# Patient Record
Sex: MALE | Race: WHITE
[De-identification: names, ages, dates, MRNs, and addresses within clinical notes are randomized per-mention and may not be internally consistent; named-entity substitution may affect disease eponyms.]

---

## 2017-05-12 ENCOUNTER — HOSPITAL ENCOUNTER (EMERGENCY)
Dept: HOSPITAL 35 - ER | Age: 52
Discharge: HOME | End: 2017-05-12
Payer: COMMERCIAL

## 2017-05-12 VITALS — SYSTOLIC BLOOD PRESSURE: 114 MMHG | DIASTOLIC BLOOD PRESSURE: 68 MMHG

## 2017-05-12 VITALS — BODY MASS INDEX: 39.55 KG/M2 | HEIGHT: 69 IN | WEIGHT: 267 LBS

## 2017-05-12 DIAGNOSIS — I47.1: Primary | ICD-10-CM

## 2017-05-12 DIAGNOSIS — Z88.5: ICD-10-CM

## 2019-09-24 ENCOUNTER — HOSPITAL ENCOUNTER (EMERGENCY)
Dept: HOSPITAL 35 - ER | Age: 54
Discharge: HOME | End: 2019-09-24
Payer: COMMERCIAL

## 2019-09-24 VITALS — SYSTOLIC BLOOD PRESSURE: 100 MMHG | DIASTOLIC BLOOD PRESSURE: 58 MMHG

## 2019-09-24 VITALS — BODY MASS INDEX: 41.47 KG/M2 | WEIGHT: 280.01 LBS | HEIGHT: 69 IN

## 2019-09-24 DIAGNOSIS — Z88.5: ICD-10-CM

## 2019-09-24 DIAGNOSIS — I47.1: Primary | ICD-10-CM

## 2019-09-24 DIAGNOSIS — Z90.49: ICD-10-CM

## 2019-09-24 LAB
ALBUMIN SERPL-MCNC: 3.6 G/DL (ref 3.4–5)
ALT SERPL-CCNC: 42 U/L (ref 30–65)
ANION GAP SERPL CALC-SCNC: 11 MMOL/L (ref 7–16)
APTT BLD: 25.4 SECONDS (ref 24.5–32.8)
AST SERPL-CCNC: 39 U/L (ref 15–37)
BASOPHILS NFR BLD AUTO: 0.7 % (ref 0–2)
BILIRUB SERPL-MCNC: 0.8 MG/DL
BUN SERPL-MCNC: 21 MG/DL (ref 7–18)
CALCIUM SERPL-MCNC: 8.6 MG/DL (ref 8.5–10.1)
CHLORIDE SERPL-SCNC: 103 MMOL/L (ref 98–107)
CO2 SERPL-SCNC: 23 MMOL/L (ref 21–32)
CREAT SERPL-MCNC: 1 MG/DL (ref 0.7–1.3)
EOSINOPHIL NFR BLD: 3.5 % (ref 0–3)
ERYTHROCYTE [DISTWIDTH] IN BLOOD BY AUTOMATED COUNT: 13.3 % (ref 10.5–14.5)
GLUCOSE SERPL-MCNC: 99 MG/DL (ref 74–106)
GRANULOCYTES NFR BLD MANUAL: 50 % (ref 36–66)
HCT VFR BLD CALC: 47 % (ref 42–52)
HGB BLD-MCNC: 16 GM/DL (ref 14–18)
INR PPP: 1
LYMPHOCYTES NFR BLD AUTO: 34.5 % (ref 24–44)
MAGNESIUM SERPL-MCNC: 2.2 MG/DL (ref 1.8–2.4)
MCH RBC QN AUTO: 30.8 PG (ref 26–34)
MCHC RBC AUTO-ENTMCNC: 34.1 G/DL (ref 28–37)
MCV RBC: 90.3 FL (ref 80–100)
MONOCYTES NFR BLD: 11.3 % (ref 1–8)
NEUTROPHILS # BLD: 4.7 THOU/UL (ref 1.4–8.2)
PLATELET # BLD: 215 THOU/UL (ref 150–400)
POTASSIUM SERPL-SCNC: 4.9 MMOL/L (ref 3.5–5.1)
PROT SERPL-MCNC: 7.4 G/DL (ref 6.4–8.2)
PROTHROMBIN TIME: 10.3 SECONDS (ref 9.3–11.4)
RBC # BLD AUTO: 5.21 MIL/UL (ref 4.5–6)
SODIUM SERPL-SCNC: 137 MMOL/L (ref 136–145)
TROPONIN I SERPL-MCNC: <0.06 NG/ML (ref ?–0.06)
WBC # BLD AUTO: 9.5 THOU/UL (ref 4–11)

## 2019-09-25 NOTE — EKG
[facility] [place] [address]  [address]
Phone:  [phone]                    ELECTROCARDIOGRAM REPORT      
_______________________________________________________________________________
 
Name:       MARY EISENBERG                Room #:                     DEP   
DIXON#:      [id number]     Account #:      [id number]  
Admission:  19    Attend Phys:                          
Discharge:  19    Date of Birth:  [birth year]  
                                                          Report #: [id number]
   [id number]
_______________________________________________________________________________
THIS REPORT FOR:   //name//                          
 
                         Formerly [facility] ED
                                       
Test Date:    2019               Test Time:    19:35:07
Pat Name:     MARY EISENBERG             Department:   
Patient ID:   [id number]            Room:          
Gender:                               Technician:   
:          1965               Requested By: Mikel Biswas
Order Number: [id number] MD:   Matthieu Molina
                                 Measurements
Intervals                              Axis          
Rate:         199                      P:            0
SD:                                    QRS:          43
QRSD:         95                       T:            -35
QT:           261                                    
QTc:          475                                    
                           Interpretive Statements
Supraventricular tachycardia
Repolarization abnormality, prob rate related
Compared to ECG 2017 13:41:18
Early repolarization now present
Sinus rhythm no longer present
 
Electronically Signed On 2019 8:04:24 CDT by Matthieu Molina
[email or web address]
 
 
 
 
 
 
 
 
 
 
 
 
 
 
 
 
 
  <ELECTRONICALLY SIGNED>
   By: Matthieu Molina MD        
  19     0804
D: 19                           _____________________________________
T: 19                           Matthieu Molina MD          /MARGARITO